# Patient Record
Sex: FEMALE | Race: WHITE | NOT HISPANIC OR LATINO | Employment: FULL TIME | URBAN - METROPOLITAN AREA
[De-identification: names, ages, dates, MRNs, and addresses within clinical notes are randomized per-mention and may not be internally consistent; named-entity substitution may affect disease eponyms.]

---

## 2021-12-21 ENCOUNTER — HOSPITAL ENCOUNTER (EMERGENCY)
Facility: MEDICAL CENTER | Age: 41
End: 2021-12-21
Attending: EMERGENCY MEDICINE
Payer: COMMERCIAL

## 2021-12-21 VITALS
DIASTOLIC BLOOD PRESSURE: 67 MMHG | RESPIRATION RATE: 16 BRPM | HEART RATE: 85 BPM | BODY MASS INDEX: 25.64 KG/M2 | TEMPERATURE: 99.2 F | HEIGHT: 61 IN | WEIGHT: 135.8 LBS | SYSTOLIC BLOOD PRESSURE: 108 MMHG | OXYGEN SATURATION: 100 %

## 2021-12-21 DIAGNOSIS — M25.472 BILATERAL SWELLING OF FEET AND ANKLES: ICD-10-CM

## 2021-12-21 DIAGNOSIS — M25.475 BILATERAL SWELLING OF FEET AND ANKLES: ICD-10-CM

## 2021-12-21 DIAGNOSIS — M25.474 BILATERAL SWELLING OF FEET AND ANKLES: ICD-10-CM

## 2021-12-21 DIAGNOSIS — M25.471 BILATERAL SWELLING OF FEET AND ANKLES: ICD-10-CM

## 2021-12-21 LAB
ALBUMIN SERPL BCP-MCNC: 4.2 G/DL (ref 3.2–4.9)
ALBUMIN/GLOB SERPL: 1.6 G/DL
ALP SERPL-CCNC: 98 U/L (ref 30–99)
ALT SERPL-CCNC: 19 U/L (ref 2–50)
ANION GAP SERPL CALC-SCNC: 10 MMOL/L (ref 7–16)
AST SERPL-CCNC: 21 U/L (ref 12–45)
BASOPHILS # BLD AUTO: 0.7 % (ref 0–1.8)
BASOPHILS # BLD: 0.07 K/UL (ref 0–0.12)
BILIRUB SERPL-MCNC: 0.4 MG/DL (ref 0.1–1.5)
BUN SERPL-MCNC: 13 MG/DL (ref 8–22)
CALCIUM SERPL-MCNC: 8.8 MG/DL (ref 8.4–10.2)
CHLORIDE SERPL-SCNC: 104 MMOL/L (ref 96–112)
CO2 SERPL-SCNC: 26 MMOL/L (ref 20–33)
CREAT SERPL-MCNC: 0.6 MG/DL (ref 0.5–1.4)
CRP SERPL HS-MCNC: 2.8 MG/L (ref 0–3)
EOSINOPHIL # BLD AUTO: 0.45 K/UL (ref 0–0.51)
EOSINOPHIL NFR BLD: 4.7 % (ref 0–6.9)
ERYTHROCYTE [DISTWIDTH] IN BLOOD BY AUTOMATED COUNT: 47.8 FL (ref 35.9–50)
ERYTHROCYTE [SEDIMENTATION RATE] IN BLOOD BY WESTERGREN METHOD: 15 MM/HOUR (ref 0–25)
GLOBULIN SER CALC-MCNC: 2.7 G/DL (ref 1.9–3.5)
GLUCOSE SERPL-MCNC: 117 MG/DL (ref 65–99)
HCT VFR BLD AUTO: 41 % (ref 37–47)
HGB BLD-MCNC: 13.4 G/DL (ref 12–16)
IMM GRANULOCYTES # BLD AUTO: 0.03 K/UL (ref 0–0.11)
IMM GRANULOCYTES NFR BLD AUTO: 0.3 % (ref 0–0.9)
LYMPHOCYTES # BLD AUTO: 1.84 K/UL (ref 1–4.8)
LYMPHOCYTES NFR BLD: 19.2 % (ref 22–41)
MCH RBC QN AUTO: 31.4 PG (ref 27–33)
MCHC RBC AUTO-ENTMCNC: 32.7 G/DL (ref 33.6–35)
MCV RBC AUTO: 96 FL (ref 81.4–97.8)
MONOCYTES # BLD AUTO: 0.75 K/UL (ref 0–0.85)
MONOCYTES NFR BLD AUTO: 7.8 % (ref 0–13.4)
NEUTROPHILS # BLD AUTO: 6.43 K/UL (ref 2–7.15)
NEUTROPHILS NFR BLD: 67.3 % (ref 44–72)
NRBC # BLD AUTO: 0 K/UL
NRBC BLD-RTO: 0 /100 WBC
PLATELET # BLD AUTO: 268 K/UL (ref 164–446)
PMV BLD AUTO: 9.9 FL (ref 9–12.9)
POTASSIUM SERPL-SCNC: 4.4 MMOL/L (ref 3.6–5.5)
PROT SERPL-MCNC: 6.9 G/DL (ref 6–8.2)
RBC # BLD AUTO: 4.27 M/UL (ref 4.2–5.4)
RHEUMATOID FACT SER IA-ACNC: 14 IU/ML (ref 0–14)
SODIUM SERPL-SCNC: 140 MMOL/L (ref 135–145)
URATE SERPL-MCNC: 4.3 MG/DL (ref 1.9–8.2)
WBC # BLD AUTO: 9.6 K/UL (ref 4.8–10.8)

## 2021-12-21 PROCEDURE — 700111 HCHG RX REV CODE 636 W/ 250 OVERRIDE (IP): Performed by: EMERGENCY MEDICINE

## 2021-12-21 PROCEDURE — 36415 COLL VENOUS BLD VENIPUNCTURE: CPT

## 2021-12-21 PROCEDURE — 85652 RBC SED RATE AUTOMATED: CPT

## 2021-12-21 PROCEDURE — 99283 EMERGENCY DEPT VISIT LOW MDM: CPT

## 2021-12-21 PROCEDURE — 86431 RHEUMATOID FACTOR QUANT: CPT

## 2021-12-21 PROCEDURE — 700102 HCHG RX REV CODE 250 W/ 637 OVERRIDE(OP): Performed by: EMERGENCY MEDICINE

## 2021-12-21 PROCEDURE — 85025 COMPLETE CBC W/AUTO DIFF WBC: CPT

## 2021-12-21 PROCEDURE — 80053 COMPREHEN METABOLIC PANEL: CPT

## 2021-12-21 PROCEDURE — A9270 NON-COVERED ITEM OR SERVICE: HCPCS | Performed by: EMERGENCY MEDICINE

## 2021-12-21 PROCEDURE — 86141 C-REACTIVE PROTEIN HS: CPT

## 2021-12-21 PROCEDURE — 84550 ASSAY OF BLOOD/URIC ACID: CPT

## 2021-12-21 RX ORDER — ACETAMINOPHEN 325 MG/1
650 TABLET ORAL ONCE
Status: COMPLETED | OUTPATIENT
Start: 2021-12-21 | End: 2021-12-21

## 2021-12-21 RX ORDER — METHYLPREDNISOLONE 4 MG/1
TABLET ORAL
Qty: 1 EACH | Refills: 0 | Status: SHIPPED | OUTPATIENT
Start: 2021-12-21

## 2021-12-21 RX ORDER — IBUPROFEN 600 MG/1
600 TABLET ORAL ONCE
Status: COMPLETED | OUTPATIENT
Start: 2021-12-21 | End: 2021-12-21

## 2021-12-21 RX ORDER — PREDNISONE 10 MG/1
20 TABLET ORAL ONCE
Status: COMPLETED | OUTPATIENT
Start: 2021-12-21 | End: 2021-12-21

## 2021-12-21 RX ADMIN — PREDNISONE 20 MG: 10 TABLET ORAL at 10:26

## 2021-12-21 RX ADMIN — IBUPROFEN 600 MG: 600 TABLET ORAL at 09:43

## 2021-12-21 RX ADMIN — ACETAMINOPHEN 650 MG: 325 TABLET ORAL at 09:43

## 2021-12-21 NOTE — DISCHARGE INSTRUCTIONS
Please obtain a primary care physician through our medical group.  Use Motrin and/or Tylenol for anti-inflammatory and pain relief.  Use Medrol Dosepak and ideally see your practitioner in about a week

## 2021-12-21 NOTE — ED TRIAGE NOTES
Bilateral ankle pain and swelling started today while at work. She recently moved here from [place]. She has NOT had the covid vaccines.

## 2021-12-21 NOTE — ED NOTES
Pt medicated as ordered. Discharged home in good condition with prescriptions and follow up instructions, pt verbalizes understanding of all, ambulates out with steady gait accompanied by self.

## 2021-12-21 NOTE — Clinical Note
Vivian Rollins was seen and treated in our emergency department on 12/21/2021.  She may return to work on 12/22/2021.  May return to full duty without restrictions     If you have any questions or concerns, please don't hesitate to call.      Chuy Brown M.D.

## 2023-02-09 ENCOUNTER — APPOINTMENT (OUTPATIENT)
Dept: RADIOLOGY | Facility: MEDICAL CENTER | Age: 43
End: 2023-02-09
Attending: EMERGENCY MEDICINE
Payer: COMMERCIAL

## 2023-02-09 ENCOUNTER — HOSPITAL ENCOUNTER (EMERGENCY)
Facility: MEDICAL CENTER | Age: 43
End: 2023-02-10
Attending: EMERGENCY MEDICINE
Payer: COMMERCIAL
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
## 2023-02-09 DIAGNOSIS — S06.0X9A CONCUSSION WITH LOSS OF CONSCIOUSNESS, INITIAL ENCOUNTER: ICD-10-CM

## 2023-02-09 DIAGNOSIS — S09.90XA CLOSED HEAD INJURY, INITIAL ENCOUNTER: ICD-10-CM

## 2023-02-09 PROCEDURE — 99284 EMERGENCY DEPT VISIT MOD MDM: CPT

## 2023-02-09 ASSESSMENT — FIBROSIS 4 INDEX: FIB4 SCORE: 0.76

## 2023-02-10 VITALS
SYSTOLIC BLOOD PRESSURE: 112 MMHG | TEMPERATURE: 97 F | HEART RATE: 84 BPM | WEIGHT: 168.43 LBS | OXYGEN SATURATION: 95 % | RESPIRATION RATE: 16 BRPM | DIASTOLIC BLOOD PRESSURE: 59 MMHG | HEIGHT: 61 IN | BODY MASS INDEX: 31.8 KG/M2

## 2023-02-10 PROCEDURE — 72125 CT NECK SPINE W/O DYE: CPT

## 2023-02-10 PROCEDURE — 70450 CT HEAD/BRAIN W/O DYE: CPT

## 2023-02-10 NOTE — ED TRIAGE NOTES
Chief Complaint   Patient presents with    T-5000 GLF     6 hours ago, pt slipped on ice today and hit the back of her head, +LOC, EMS arrived and pt declined services. Pt states she went home and began having photosensitivity and dizziness. Pt has pain to back of head. -Price's sign. Pt AOx4, Neuro intact, -incontinence     Pt ambulatory to triage for above complaint. C-collar applied.     Pt is alert/oriented and follows commands. Pt speaking in full sentences and responds appropriately to questions. No acute distress noted in triage and respirations are even and unlabored.     Pt placed in lobby and educated on triage process. Pt encouraged to alert staff for any changes in condition.

## 2023-02-10 NOTE — ED NOTES
Patient provided discharge instructions and denies any further questions. Patient ambulated to the front lobby with a steady gait and all belongings.

## 2023-02-10 NOTE — ED NOTES
Patient ambulated to green 39 with a steady gait and all belongings. Patient placed on the monitor. Bed locked and in lowest position. ERP to see.

## 2023-02-10 NOTE — ED PROVIDER NOTES
"  ER Provider Note    Scribed for Demetris Khalil M.d. by Kathy Dowd. 2/9/2023  11:22 PM    Primary Care Provider: Patient states none.    CHIEF COMPLAINT  Chief Complaint   Patient presents with    T-5000 GLF     6 hours ago, pt slipped on ice today and hit the back of her head, +LOC, EMS arrived and pt declined services. Pt states she went home and began having photosensitivity and dizziness. Pt has pain to back of head. -Price's sign. Pt AOx4, Neuro intact, -incontinence     EXTERNAL RECORDS REVIEWED  Outpatient Notes reviewed to show she had an office visit in June for urinary frequency.    HPI/ROS  LIMITATION TO HISTORY   Select: : None  OUTSIDE HISTORIAN(S):  None    Vivian Elvin Rollins is a 42 y.o. female who presents to the ED complaining of moderate head pain onset six hours ago. The patient states that she was outside in a [employer] parking lot when she slipped and hit her head. She endorses loss of consciousness during this time. Patient denies any other pain at this time. She states that her headache is exacerbated by light, and that Advil minimally alleviated her symptoms. The patient notes that she is not on any blood thinners, and has no major medical history.     PAST MEDICAL HISTORY  History reviewed. No pertinent past medical history.    SURGICAL HISTORY  History reviewed. No pertinent surgical history.    FAMILY HISTORY  History reviewed. No pertinent family history.    SOCIAL HISTORY   reports that she has been smoking cigarettes. She has a 7.50 pack-year smoking history. She does not have any smokeless tobacco history on file. She reports current drug use. She reports that she does not drink alcohol.    CURRENT MEDICATIONS  Previous Medications    METHYLPREDNISOLONE (MEDROL DOSEPAK) 4 MG TABLET THERAPY PACK    Use as directed     ALLERGIES  Patient has no known allergies.    PHYSICAL EXAM  BP (!) 158/83   Pulse (!) 102   Temp 36.2 °C (97.2 °F) (Temporal)   Resp 18   Ht 1.549 m (5' 1\")   Wt " 76.4 kg (168 lb 6.9 oz)   LMP 01/04/2023 (Within Weeks)   SpO2 97%   BMI 31.82 kg/m²   Constitutional: Alert in no apparent distress.  HENT: No signs of trauma, Bilateral external ears normal, Nose normal.   Eyes: Pupils are equal and reactive, Conjunctiva normal, Non-icteric.   Neck: High cervical midline tenderness to palpation, tenderness to back of scalp. Normal range of motion, Supple, No stridor.   Lymphatic: No lymphadenopathy noted.   Cardiovascular: Regular rate and rhythm, no murmurs.   Thorax & Lungs: Normal breath sounds, No respiratory distress, No wheezing, No chest tenderness.   Abdomen: Bowel sounds normal, Soft, No tenderness, No masses, No pulsatile masses. No peritoneal signs.  Skin: Warm, Dry, No erythema, No rash.   Back: No bony tenderness, No CVA tenderness.   Extremities: Intact distal pulses, No edema, No tenderness, No cyanosis.  Musculoskeletal: Good range of motion in all major joints. No tenderness to palpation or major deformities noted.   Neurologic: Alert , Normal motor function, Normal sensory function, No focal deficits noted.   Psychiatric: Affect normal, Judgment normal, Mood normal.     DIAGNOSTIC STUDIES    Radiology:   The attending emergency physician has independently interpreted the diagnostic imaging associated with this visit and am waiting the final reading from the radiologist.     CT-CSPINE WITHOUT PLUS RECONS   Final Result         1.  No acute traumatic bony injury of the cervical spine is apparent.      CT-HEAD W/O   Final Result         1.  No acute intracranial abnormality.           I have reviewed this imaging and interpreted it to show: No intracranial bleed.    COURSE & MEDICAL DECISION MAKING     ED Observation Status? Yes; I am placing the patient in to an observation status due to a diagnostic uncertainty as well as therapeutic intensity. Patient placed in observation status at 11:25 PM, 2/9/2023.     Observation plan is as follows: Neurologic  reassessments and physical exam reassessments.    Upon Reevaluation, the patient's condition has: Improved; and will be discharged.    Patient discharged from ED Observation status at 12:50 AM on 2/10/23.     INITIAL ASSESSMENT, COURSE AND PLAN    Care Narrative:     11:23 PM - Patient evaluated at bedside. I discussed the plan for care, including imaging. Patient verbalizes understanding and agreement to this plan of care. Ordered CT-Cspine and CT-Head for evaluation.    12:53 AM - Patient was reevaluated at bedside. Discussed radiology results with the patient and informed them of my findings. I discussed the plan for discharged. Patient verbalizes understanding and agreement to this plan of care.     Differentials: Intracranial bleed, spine fracture, concussion    ADDITIONAL PROBLEM LIST  Patient presents with a mechanical fall.  I am concerned about intracranial bleed.  A CT scan was performed that was negative.  Her neurologic status stayed the same.  We recommend pain medication and we will give her strict return precautions and follow-up.    DISPOSITION AND DISCUSSIONS  I have discussed management of the patient with the following physicians and MARTIN's:  None    Discussion of management with other QHP or appropriate source(s): None     Escalation of care considered, and ultimately not performed: acute inpatient care management, however at this time, the patient is most appropriate for outpatient management.    Barriers to care at this time, including but not limited to: Patient does not have established PCP.     Decision tools and prescription drugs considered including, but not limited to: Pain Medications over-the-counter seems reasonable. .    The patient will return for new or worsening symptoms and is stable at the time of discharge.    The patient is referred to a primary physician for blood pressure management, diabetic screening, and for all other preventative health concerns.    DISPOSITION:  Patient  will be discharged home in stable condition.    FOLLOW UP:  [facility] - Primary [facility]  [address] [facility] [address]  [phone]        FINAL DIANGOSIS  1. Fall, initial encounter    2. Closed head injury, initial encounter    3. Concussion with loss of consciousness, initial encounter         IKathy (Scribe), am scribing for, and in the presence of, Demetris Khalil M.D..    Electronically signed by: Kathy Dowd (Scribe), 2/9/2023    IDemetris M.D. personally performed the services described in this documentation, as scribed by Kathy Dowd in my presence, and it is both accurate and complete.     The note accurately reflects work and decisions made by me.  Demetris Khalil M.D.  2/10/2023  3:28 AM